# Patient Record
Sex: FEMALE | Race: WHITE | NOT HISPANIC OR LATINO
[De-identification: names, ages, dates, MRNs, and addresses within clinical notes are randomized per-mention and may not be internally consistent; named-entity substitution may affect disease eponyms.]

---

## 2019-06-13 ENCOUNTER — FORM ENCOUNTER (OUTPATIENT)
Age: 59
End: 2019-06-13

## 2020-06-18 ENCOUNTER — FORM ENCOUNTER (OUTPATIENT)
Age: 60
End: 2020-06-18

## 2021-02-11 PROBLEM — Z00.00 ENCOUNTER FOR PREVENTIVE HEALTH EXAMINATION: Status: ACTIVE | Noted: 2021-02-11

## 2021-05-27 DIAGNOSIS — Z78.9 OTHER SPECIFIED HEALTH STATUS: ICD-10-CM

## 2021-05-27 DIAGNOSIS — Z80.3 FAMILY HISTORY OF MALIGNANT NEOPLASM OF BREAST: ICD-10-CM

## 2021-06-01 ENCOUNTER — APPOINTMENT (OUTPATIENT)
Dept: BREAST CENTER | Facility: CLINIC | Age: 61
End: 2021-06-01
Payer: COMMERCIAL

## 2021-06-01 VITALS — HEIGHT: 65 IN | WEIGHT: 170 LBS | BODY MASS INDEX: 28.32 KG/M2

## 2021-06-01 DIAGNOSIS — Z78.9 OTHER SPECIFIED HEALTH STATUS: ICD-10-CM

## 2021-06-01 PROCEDURE — 99213 OFFICE O/P EST LOW 20 MIN: CPT

## 2021-06-01 PROCEDURE — 99072 ADDL SUPL MATRL&STAF TM PHE: CPT

## 2021-06-01 NOTE — HISTORY OF PRESENT ILLNESS
[FreeTextEntry1] : The patient is a 60-year-old G2, P2 postmenopausal white female with Polish, Liberian, and Belarusian descent.  She never took any hormone replacement therapy.  She underwent menarche at age 14 and had her first child at age 27.  She has a family history with her mother who had breast cancer at age 57, and a maternal grandmother with breast cancer who passed away at age 67.  She has a Idania model lifetime risk of breast cancer 15.8%.  She comes in for yearly follow-up and has been getting yearly mammography and ultrasound.

## 2021-06-01 NOTE — ASSESSMENT
[FreeTextEntry1] : The patient is a 60-year-old G2, P2 postmenopausal white female with Polish, Samoan, and Micronesian descent.  She never took any hormone replacement therapy.  She underwent menarche at age 14 and had her first child at age 27.  She has a family history with her mother who had breast cancer at age 57, and a maternal grandmother with breast cancer who passed away at age 67.  She has a Idania model lifetime risk of breast cancer 15.8%.  On exam today, I cannot feel any suspicious densities in either breast.  She underwent a recent bilateral mammography and ultrasound at [facility] on February 20, 2021 which showed no suspicious findings and a stable left breast 4:00 hypoechoic density.  She was reassured and should follow-up again in 1 year and will be due her her next bilateral mammography and ultrasound again in February 2022.

## 2021-06-01 NOTE — PHYSICAL EXAM
[Normocephalic] : normocephalic [Atraumatic] : atraumatic [EOMI] : extra ocular movement intact [Supple] : supple [No Supraclavicular Adenopathy] : no supraclavicular adenopathy [No Cervical Adenopathy] : no cervical adenopathy [Examined in the supine and seated position] : examined in the supine and seated position [No dominant masses] : no dominant masses in right breast  [No dominant masses] : no dominant masses left breast [No Nipple Retraction] : no left nipple retraction [No Nipple Discharge] : no left nipple discharge [Breast Mass Right Breast ___cm] : no masses [Breast Mass Left Breast ___cm] : no masses [Breast Nipple Inversion] : nipples not inverted [Breast Nipple Retraction] : nipples not retracted [Breast Nipple Flattening] : nipples not flattened [Breast Nipple Fissures] : nipples not fissured [Breast Abnormal Lactation (Galactorrhea)] : no galactorrhea [Breast Abnormal Secretion Bloody Fluid] : no bloody discharge [Breast Abnormal Secretion Serous Fluid] : no serous discharge [Breast Abnormal Secretion Opalescent Fluid] : no milky discharge [No Axillary Lymphadenopathy] : no left axillary lymphadenopathy [No Edema] : no edema [No Rashes] : no rashes [No Ulceration] : no ulceration [de-identified] : On exam, the patient has moderately ptotic B-cup breasts.  On palpation, I cannot feel any suspicious densities in either breast.  She has no axillary, supraclavicular, or cervical adenopathy.

## 2021-06-01 NOTE — REASON FOR VISIT
[Follow-Up: _____] : a [unfilled] follow-up visit [FreeTextEntry1] : The patient comes in with a strong family history of breast cancer and history of fibrocystic mastopathy.

## 2021-06-18 ENCOUNTER — APPOINTMENT (OUTPATIENT)
Dept: BREAST CENTER | Facility: CLINIC | Age: 61
End: 2021-06-18

## 2022-06-02 DIAGNOSIS — N63.10 UNSPECIFIED LUMP IN THE RIGHT BREAST, UNSPECIFIED QUADRANT: ICD-10-CM

## 2022-06-02 DIAGNOSIS — N63.20 UNSPECIFIED LUMP IN THE LEFT BREAST, UNSPECIFIED QUADRANT: ICD-10-CM

## 2022-06-06 ENCOUNTER — APPOINTMENT (OUTPATIENT)
Dept: BREAST CENTER | Facility: CLINIC | Age: 62
End: 2022-06-06
Payer: COMMERCIAL

## 2022-06-06 VITALS
HEART RATE: 69 BPM | DIASTOLIC BLOOD PRESSURE: 87 MMHG | HEIGHT: 65 IN | OXYGEN SATURATION: 97 % | SYSTOLIC BLOOD PRESSURE: 142 MMHG | WEIGHT: 171 LBS | BODY MASS INDEX: 28.49 KG/M2

## 2022-06-06 PROCEDURE — 99072 ADDL SUPL MATRL&STAF TM PHE: CPT

## 2022-06-06 PROCEDURE — 99213 OFFICE O/P EST LOW 20 MIN: CPT

## 2022-06-06 NOTE — PHYSICAL EXAM
[Normocephalic] : normocephalic [Atraumatic] : atraumatic [EOMI] : extra ocular movement intact [Supple] : supple [No Supraclavicular Adenopathy] : no supraclavicular adenopathy [No Cervical Adenopathy] : no cervical adenopathy [Examined in the supine and seated position] : examined in the supine and seated position [No dominant masses] : no dominant masses in right breast  [No dominant masses] : no dominant masses left breast [No Nipple Retraction] : no left nipple retraction [No Nipple Discharge] : no left nipple discharge [Breast Mass Right Breast ___cm] : no masses [Breast Mass Left Breast ___cm] : no masses [No Axillary Lymphadenopathy] : no left axillary lymphadenopathy [No Edema] : no edema [No Rashes] : no rashes [No Ulceration] : no ulceration [Breast Nipple Inversion] : nipples not inverted [Breast Nipple Retraction] : nipples not retracted [Breast Nipple Flattening] : nipples not flattened [Breast Abnormal Lactation (Galactorrhea)] : no galactorrhea [Breast Nipple Fissures] : nipples not fissured [Breast Abnormal Secretion Bloody Fluid] : no bloody discharge [Breast Abnormal Secretion Serous Fluid] : no serous discharge [Breast Abnormal Secretion Opalescent Fluid] : no milky discharge [de-identified] : On exam, the patient has moderately ptotic B-cup breasts.  On palpation, I cannot feel any suspicious densities in either breast.  She has no axillary, supraclavicular, or cervical adenopathy.

## 2022-06-06 NOTE — HISTORY OF PRESENT ILLNESS
[FreeTextEntry1] : The patient is a 61-year-old G2, P2 postmenopausal white female with Polish, Citizen of the [place], and Turkish descent.  She never took any hormone replacement therapy.  She underwent menarche at age 14 and had her first child at age 27.  She has a family history with her mother who had breast cancer at age 57, and a maternal grandmother with breast cancer who passed away at age 67.  She has a Idania model lifetime risk of breast cancer 15.8%.  She comes in for yearly follow-up and has been getting yearly mammography and ultrasound.

## 2022-06-06 NOTE — ASSESSMENT
[FreeTextEntry1] : The patient is a 61-year-old G2, P2 postmenopausal white female with Polish, Iranian, and Sudanese descent.  She never took any hormone replacement therapy.  She underwent menarche at age 14 and had her first child at age 27.  She has a family history with her mother who had breast cancer at age 57, and a maternal grandmother with breast cancer who passed away at age 67.  She has a Idania model lifetime risk of breast cancer 15.8%.  On exam today, I cannot feel any suspicious densities in either breast.  She underwent her last bilateral mammography and ultrasound at [facility] on March 1, 2022 which showed no suspicious findings and stable bilateral benign-appearing densities.  She was reassured and should follow-up again in 1 year and will be due her her next bilateral mammography and ultrasound again in March 2023 and was given prescriptions.

## 2023-03-09 ENCOUNTER — NON-APPOINTMENT (OUTPATIENT)
Age: 63
End: 2023-03-09

## 2023-03-15 ENCOUNTER — RESULT REVIEW (OUTPATIENT)
Age: 63
End: 2023-03-15

## 2023-06-06 ENCOUNTER — APPOINTMENT (OUTPATIENT)
Dept: BREAST CENTER | Facility: CLINIC | Age: 63
End: 2023-06-06
Payer: COMMERCIAL

## 2023-06-06 VITALS — BODY MASS INDEX: 28.82 KG/M2 | WEIGHT: 173 LBS | HEIGHT: 65 IN

## 2023-06-06 PROCEDURE — 99213 OFFICE O/P EST LOW 20 MIN: CPT

## 2023-06-06 NOTE — HISTORY OF PRESENT ILLNESS
[FreeTextEntry1] : The patient is a 62-year-old G2, P2 postmenopausal white female with Polish, [place]ao [place], and Slovenian descent.  She never took any hormone replacement therapy.  She underwent menarche at age 14 and had her first child at age 27.  She has a family history with her mother who had breast cancer at age 57, and a maternal grandmother with breast cancer who passed away at age 67.  She has a Idania model lifetime risk of breast cancer 15.8%.  She comes in for yearly follow-up and has been getting yearly mammography and ultrasound.

## 2023-06-06 NOTE — PHYSICAL EXAM
[Normocephalic] : normocephalic [Atraumatic] : atraumatic [EOMI] : extra ocular movement intact [Supple] : supple [No Supraclavicular Adenopathy] : no supraclavicular adenopathy [No Cervical Adenopathy] : no cervical adenopathy [Examined in the supine and seated position] : examined in the supine and seated position [No dominant masses] : no dominant masses in right breast  [No dominant masses] : no dominant masses left breast [No Nipple Retraction] : no left nipple retraction [No Nipple Discharge] : no left nipple discharge [Breast Mass Right Breast ___cm] : no masses [Breast Mass Left Breast ___cm] : no masses [No Axillary Lymphadenopathy] : no left axillary lymphadenopathy [No Edema] : no edema [No Rashes] : no rashes [No Ulceration] : no ulceration [Breast Nipple Inversion] : nipples not inverted [Breast Nipple Retraction] : nipples not retracted [Breast Nipple Flattening] : nipples not flattened [Breast Nipple Fissures] : nipples not fissured [Breast Abnormal Lactation (Galactorrhea)] : no galactorrhea [Breast Abnormal Secretion Bloody Fluid] : no bloody discharge [Breast Abnormal Secretion Serous Fluid] : no serous discharge [Breast Abnormal Secretion Opalescent Fluid] : no milky discharge [de-identified] : On exam, the patient has moderately ptotic B-cup breasts.  On palpation, I cannot feel any suspicious densities in either breast.  She has no axillary, supraclavicular, or cervical adenopathy.

## 2023-06-06 NOTE — ASSESSMENT
[FreeTextEntry1] : The patient is a 62-year-old G2, P2 postmenopausal white female with Polish, Bahraini, and Bruneian descent.  She never took any hormone replacement therapy.  She underwent menarche at age 14 and had her first child at age 27.  She has a family history with her mother who had breast cancer at age 57, and a maternal grandmother with breast cancer who passed away at age 67.  She has a Idania model lifetime risk of breast cancer 15.8%.  On exam today, I cannot feel any suspicious densities in either breast.  She underwent her last bilateral mammography and ultrasound at [facility] on March 16, 2023 which showed no suspicious findings and stable bilateral benign-appearing densities.  She was reassured and should follow-up again in 1 year and will be due her her next bilateral mammography and ultrasound again in March 2024 and was given prescriptions.

## 2024-03-18 ENCOUNTER — RESULT REVIEW (OUTPATIENT)
Age: 64
End: 2024-03-18

## 2024-05-15 ENCOUNTER — NON-APPOINTMENT (OUTPATIENT)
Age: 64
End: 2024-05-15

## 2024-05-31 ENCOUNTER — TRANSCRIPTION ENCOUNTER (OUTPATIENT)
Age: 64
End: 2024-05-31

## 2024-06-07 NOTE — PHYSICAL EXAM
[Normocephalic] : normocephalic [Atraumatic] : atraumatic [EOMI] : extra ocular movement intact [Supple] : supple [No Supraclavicular Adenopathy] : no supraclavicular adenopathy [No Cervical Adenopathy] : no cervical adenopathy [Examined in the supine and seated position] : examined in the supine and seated position [No dominant masses] : no dominant masses in right breast  [No dominant masses] : no dominant masses left breast [No Nipple Retraction] : no left nipple retraction [No Nipple Discharge] : no left nipple discharge [Breast Mass Right Breast ___cm] : no masses [Breast Mass Left Breast ___cm] : no masses [No Axillary Lymphadenopathy] : no left axillary lymphadenopathy [No Edema] : no edema [No Rashes] : no rashes [No Ulceration] : no ulceration [Breast Nipple Inversion] : nipples not inverted [Breast Nipple Retraction] : nipples not retracted [Breast Nipple Flattening] : nipples not flattened [Breast Nipple Fissures] : nipples not fissured [Breast Abnormal Lactation (Galactorrhea)] : no galactorrhea [Breast Abnormal Secretion Bloody Fluid] : no bloody discharge [Breast Abnormal Secretion Serous Fluid] : no serous discharge [Breast Abnormal Secretion Opalescent Fluid] : no milky discharge [de-identified] : On exam, the patient has moderately ptotic B-cup breasts.  On palpation, I cannot feel any suspicious densities in either breast.  She has no axillary, supraclavicular, or cervical adenopathy.

## 2024-06-07 NOTE — HISTORY OF PRESENT ILLNESS
[FreeTextEntry1] : The patient is a 63-year-old G2, P2 postmenopausal white female with Polish, Niuean, and Sami descent.  She never took any hormone replacement therapy.  She underwent menarche at age 14 and had her first child at age 27.  She has a family history with her mother who had breast cancer at age 57, and a maternal grandmother with breast cancer who passed away at age 67.  She has a Idania model lifetime risk of breast cancer 15.8%.  She comes in for yearly follow-up and has been getting yearly mammography and ultrasound.

## 2024-06-07 NOTE — ASSESSMENT
[FreeTextEntry1] : The patient is a 63-year-old G2, P2 postmenopausal white female with Polish, Estonian, and English descent.  She never took any hormone replacement therapy.  She underwent menarche at age 14 and had her first child at age 27.  She has a family history with her mother who had breast cancer at age 57, and a maternal grandmother with breast cancer who passed away at age 67.  She has a Idania model lifetime risk of breast cancer 15.8%.  On exam today, I cannot feel any suspicious densities in either breast.  She underwent her last bilateral mammography and ultrasound which was reviewed from March 19, 2024 and performed at [facility] which showed no suspicious findings and stable bilateral benign-appearing densities and calcifications.  She was reassured and should follow-up again in 1 year and will be due her next bilateral mammography and ultrasound again in March 2025 and was given prescriptions.

## 2024-06-14 ENCOUNTER — APPOINTMENT (OUTPATIENT)
Dept: BREAST CENTER | Facility: CLINIC | Age: 64
End: 2024-06-14
Payer: COMMERCIAL

## 2024-06-14 VITALS
DIASTOLIC BLOOD PRESSURE: 92 MMHG | SYSTOLIC BLOOD PRESSURE: 134 MMHG | HEIGHT: 65 IN | HEART RATE: 61 BPM | BODY MASS INDEX: 27.49 KG/M2 | OXYGEN SATURATION: 98 % | WEIGHT: 165 LBS

## 2024-06-14 DIAGNOSIS — N60.11 DIFFUSE CYSTIC MASTOPATHY OF LEFT BREAST: ICD-10-CM

## 2024-06-14 DIAGNOSIS — N60.12 DIFFUSE CYSTIC MASTOPATHY OF LEFT BREAST: ICD-10-CM

## 2024-06-14 DIAGNOSIS — Z80.3 FAMILY HISTORY OF MALIGNANT NEOPLASM OF BREAST: ICD-10-CM

## 2024-06-14 DIAGNOSIS — Z12.39 ENCOUNTER FOR OTHER SCREENING FOR MALIGNANT NEOPLASM OF BREAST: ICD-10-CM

## 2024-06-14 DIAGNOSIS — R92.30 DENSE BREASTS, UNSPECIFIED: ICD-10-CM

## 2024-06-14 PROCEDURE — 99212 OFFICE O/P EST SF 10 MIN: CPT

## 2024-06-14 NOTE — ASSESSMENT
[FreeTextEntry1] : The patient is a 63-year-old G2, P2 postmenopausal white female with Polish, Thai, and Iraqi descent.  She never took any hormone replacement therapy.  She underwent menarche at age 14 and had her first child at age 27.  She has a family history with her mother who had breast cancer at age 57, and a maternal grandmother with breast cancer who passed away at age 67.  She has a Idania model lifetime risk of breast cancer 15.8%.  On exam today, I cannot feel any suspicious densities in either breast.  She underwent her last bilateral mammography and ultrasound which was reviewed from March 19, 2024 and performed at [facility] which showed no suspicious findings and stable bilateral benign-appearing densities and calcifications.  She was reassured and should follow-up again in 1 year and will be due her next bilateral mammography and ultrasound again in March 2025 and was given prescriptions.  Of note, her daughter did undergo genetic testing and was negative.  The patient herself has been refusing referral to the genetic counselor for genetic testing.

## 2024-06-14 NOTE — PHYSICAL EXAM
[Normocephalic] : normocephalic [Atraumatic] : atraumatic [EOMI] : extra ocular movement intact [Supple] : supple [No Supraclavicular Adenopathy] : no supraclavicular adenopathy [No Cervical Adenopathy] : no cervical adenopathy [Examined in the supine and seated position] : examined in the supine and seated position [No dominant masses] : no dominant masses in right breast  [No dominant masses] : no dominant masses left breast [No Nipple Retraction] : no left nipple retraction [No Nipple Discharge] : no left nipple discharge [Breast Mass Right Breast ___cm] : no masses [Breast Mass Left Breast ___cm] : no masses [No Axillary Lymphadenopathy] : no left axillary lymphadenopathy [No Edema] : no edema [No Rashes] : no rashes [No Ulceration] : no ulceration [Breast Nipple Inversion] : nipples not inverted [Breast Nipple Retraction] : nipples not retracted [Breast Nipple Flattening] : nipples not flattened [Breast Nipple Fissures] : nipples not fissured [Breast Abnormal Lactation (Galactorrhea)] : no galactorrhea [Breast Abnormal Secretion Bloody Fluid] : no bloody discharge [Breast Abnormal Secretion Serous Fluid] : no serous discharge [Breast Abnormal Secretion Opalescent Fluid] : no milky discharge [de-identified] : On exam, the patient has moderately ptotic B-cup breasts.  On palpation, I cannot feel any suspicious densities in either breast.  She has no axillary, supraclavicular, or cervical adenopathy.

## 2024-06-14 NOTE — HISTORY OF PRESENT ILLNESS
[FreeTextEntry1] : The patient is a 63-year-old G2, P2 postmenopausal white female with Polish, Norwegian, and Latvian descent.  She never took any hormone replacement therapy.  She underwent menarche at age 14 and had her first child at age 27.  She has a family history with her mother who had breast cancer at age 57, and a maternal grandmother with breast cancer who passed away at age 67.  She has a Idania model lifetime risk of breast cancer 15.8%.  She comes in for yearly follow-up and has been getting yearly mammography and ultrasound.

## 2025-03-25 ENCOUNTER — RESULT REVIEW (OUTPATIENT)
Age: 65
End: 2025-03-25

## 2025-05-01 ENCOUNTER — NON-APPOINTMENT (OUTPATIENT)
Age: 65
End: 2025-05-01

## 2025-06-14 ENCOUNTER — NON-APPOINTMENT (OUTPATIENT)
Age: 65
End: 2025-06-14

## 2025-06-16 ENCOUNTER — APPOINTMENT (OUTPATIENT)
Dept: BREAST CENTER | Facility: CLINIC | Age: 65
End: 2025-06-16
Payer: COMMERCIAL

## 2025-06-16 VITALS — BODY MASS INDEX: 27.49 KG/M2 | WEIGHT: 165 LBS | HEIGHT: 65 IN

## 2025-06-16 PROCEDURE — 99213 OFFICE O/P EST LOW 20 MIN: CPT
